# Patient Record
Sex: MALE | Race: OTHER | Employment: OTHER | URBAN - METROPOLITAN AREA
[De-identification: names, ages, dates, MRNs, and addresses within clinical notes are randomized per-mention and may not be internally consistent; named-entity substitution may affect disease eponyms.]

---

## 2023-11-21 ENCOUNTER — HOSPITAL ENCOUNTER (EMERGENCY)
Facility: HOSPITAL | Age: 73
Discharge: HOME | End: 2023-11-22
Attending: EMERGENCY MEDICINE
Payer: MEDICARE

## 2023-11-21 ENCOUNTER — APPOINTMENT (EMERGENCY)
Dept: CT IMAGING | Facility: HOSPITAL | Age: 73
End: 2023-11-21
Payer: MEDICARE

## 2023-11-21 DIAGNOSIS — R11.10 VOMITING: ICD-10-CM

## 2023-11-21 DIAGNOSIS — E86.0 DEHYDRATION: ICD-10-CM

## 2023-11-21 DIAGNOSIS — R07.9 CHEST PAIN: ICD-10-CM

## 2023-11-21 DIAGNOSIS — R53.1 ASTHENIA: Primary | ICD-10-CM

## 2023-11-21 DIAGNOSIS — R19.7 DIARRHEA: ICD-10-CM

## 2023-11-21 DIAGNOSIS — R53.1 WEAKNESS: ICD-10-CM

## 2023-11-21 DIAGNOSIS — R06.00 DYSPNEA: ICD-10-CM

## 2023-11-21 LAB
ALBUMIN SERPL BCP-MCNC: 3.8 G/DL (ref 3.5–5)
ALP SERPL-CCNC: 74 U/L (ref 34–104)
ALT SERPL W P-5'-P-CCNC: 25 U/L (ref 7–52)
ANION GAP SERPL CALCULATED.3IONS-SCNC: 8 MMOL/L
APTT PPP: 31 SECONDS (ref 23–37)
AST SERPL W P-5'-P-CCNC: 21 U/L (ref 13–39)
BACTERIA UR QL AUTO: NORMAL /HPF
BASE EX.OXY STD BLDV CALC-SCNC: 88.8 % (ref 60–80)
BASE EXCESS BLDV CALC-SCNC: -3.3 MMOL/L
BASOPHILS # BLD AUTO: 0.02 THOUSANDS/ÂΜL (ref 0–0.1)
BASOPHILS NFR BLD AUTO: 0 % (ref 0–1)
BETA-HYDROXYBUTYRATE: 1.7 MMOL/L
BILIRUB SERPL-MCNC: 0.43 MG/DL (ref 0.2–1)
BILIRUB UR QL STRIP: NEGATIVE
BUN SERPL-MCNC: 18 MG/DL (ref 5–25)
CALCIUM SERPL-MCNC: 8.8 MG/DL (ref 8.4–10.2)
CARDIAC TROPONIN I PNL SERPL HS: 4 NG/L
CHLORIDE SERPL-SCNC: 109 MMOL/L (ref 96–108)
CLARITY UR: CLEAR
CO2 SERPL-SCNC: 20 MMOL/L (ref 21–32)
COLOR UR: YELLOW
CREAT SERPL-MCNC: 0.61 MG/DL (ref 0.6–1.3)
D DIMER PPP FEU-MCNC: 0.47 UG/ML FEU
EOSINOPHIL # BLD AUTO: 0.1 THOUSAND/ÂΜL (ref 0–0.61)
EOSINOPHIL NFR BLD AUTO: 1 % (ref 0–6)
ERYTHROCYTE [DISTWIDTH] IN BLOOD BY AUTOMATED COUNT: 11.9 % (ref 11.6–15.1)
GFR SERPL CREATININE-BSD FRML MDRD: 104 ML/MIN/1.73SQ M
GLUCOSE SERPL-MCNC: 156 MG/DL (ref 65–140)
GLUCOSE SERPL-MCNC: 166 MG/DL (ref 65–140)
GLUCOSE UR STRIP-MCNC: ABNORMAL MG/DL
HCO3 BLDV-SCNC: 22.4 MMOL/L (ref 24–30)
HCT VFR BLD AUTO: 37.3 % (ref 36.5–49.3)
HGB BLD-MCNC: 12 G/DL (ref 12–17)
HGB UR QL STRIP.AUTO: NEGATIVE
IMM GRANULOCYTES # BLD AUTO: 0.03 THOUSAND/UL (ref 0–0.2)
IMM GRANULOCYTES NFR BLD AUTO: 0 % (ref 0–2)
INR PPP: 1.04 (ref 0.84–1.19)
KETONES UR STRIP-MCNC: ABNORMAL MG/DL
LACTATE SERPL-SCNC: 1 MMOL/L (ref 0.5–2)
LEUKOCYTE ESTERASE UR QL STRIP: ABNORMAL
LIPASE SERPL-CCNC: 26 U/L (ref 11–82)
LYMPHOCYTES # BLD AUTO: 1.02 THOUSANDS/ÂΜL (ref 0.6–4.47)
LYMPHOCYTES NFR BLD AUTO: 12 % (ref 14–44)
MCH RBC QN AUTO: 28.7 PG (ref 26.8–34.3)
MCHC RBC AUTO-ENTMCNC: 32.2 G/DL (ref 31.4–37.4)
MCV RBC AUTO: 89 FL (ref 82–98)
MONOCYTES # BLD AUTO: 0.48 THOUSAND/ÂΜL (ref 0.17–1.22)
MONOCYTES NFR BLD AUTO: 6 % (ref 4–12)
NEUTROPHILS # BLD AUTO: 6.7 THOUSANDS/ÂΜL (ref 1.85–7.62)
NEUTS SEG NFR BLD AUTO: 81 % (ref 43–75)
NITRITE UR QL STRIP: NEGATIVE
NON-SQ EPI CELLS URNS QL MICRO: NORMAL /HPF
NRBC BLD AUTO-RTO: 0 /100 WBCS
O2 CT BLDV-SCNC: 16.6 ML/DL
PCO2 BLDV: 42.4 MM HG (ref 42–50)
PH BLDV: 7.34 [PH] (ref 7.3–7.4)
PH UR STRIP.AUTO: 7 [PH]
PLATELET # BLD AUTO: 203 THOUSANDS/UL (ref 149–390)
PMV BLD AUTO: 9.9 FL (ref 8.9–12.7)
PO2 BLDV: 61 MM HG (ref 35–45)
POTASSIUM SERPL-SCNC: 3.7 MMOL/L (ref 3.5–5.3)
PROT SERPL-MCNC: 6.7 G/DL (ref 6.4–8.4)
PROT UR STRIP-MCNC: ABNORMAL MG/DL
PROTHROMBIN TIME: 14.2 SECONDS (ref 11.6–14.5)
RBC # BLD AUTO: 4.18 MILLION/UL (ref 3.88–5.62)
RBC #/AREA URNS AUTO: NORMAL /HPF
SODIUM SERPL-SCNC: 137 MMOL/L (ref 135–147)
SP GR UR STRIP.AUTO: 1.03 (ref 1–1.03)
UROBILINOGEN UR STRIP-ACNC: <2 MG/DL
WBC # BLD AUTO: 8.35 THOUSAND/UL (ref 4.31–10.16)
WBC #/AREA URNS AUTO: NORMAL /HPF

## 2023-11-21 PROCEDURE — 83690 ASSAY OF LIPASE: CPT | Performed by: EMERGENCY MEDICINE

## 2023-11-21 PROCEDURE — 87040 BLOOD CULTURE FOR BACTERIA: CPT | Performed by: EMERGENCY MEDICINE

## 2023-11-21 PROCEDURE — 70450 CT HEAD/BRAIN W/O DYE: CPT

## 2023-11-21 PROCEDURE — 81001 URINALYSIS AUTO W/SCOPE: CPT | Performed by: EMERGENCY MEDICINE

## 2023-11-21 PROCEDURE — 82948 REAGENT STRIP/BLOOD GLUCOSE: CPT

## 2023-11-21 PROCEDURE — 80053 COMPREHEN METABOLIC PANEL: CPT | Performed by: EMERGENCY MEDICINE

## 2023-11-21 PROCEDURE — 85379 FIBRIN DEGRADATION QUANT: CPT | Performed by: EMERGENCY MEDICINE

## 2023-11-21 PROCEDURE — 85730 THROMBOPLASTIN TIME PARTIAL: CPT | Performed by: EMERGENCY MEDICINE

## 2023-11-21 PROCEDURE — 85025 COMPLETE CBC W/AUTO DIFF WBC: CPT | Performed by: EMERGENCY MEDICINE

## 2023-11-21 PROCEDURE — 85610 PROTHROMBIN TIME: CPT | Performed by: EMERGENCY MEDICINE

## 2023-11-21 PROCEDURE — 99285 EMERGENCY DEPT VISIT HI MDM: CPT | Performed by: EMERGENCY MEDICINE

## 2023-11-21 PROCEDURE — 99284 EMERGENCY DEPT VISIT MOD MDM: CPT

## 2023-11-21 PROCEDURE — 83605 ASSAY OF LACTIC ACID: CPT | Performed by: EMERGENCY MEDICINE

## 2023-11-21 PROCEDURE — 74177 CT ABD & PELVIS W/CONTRAST: CPT

## 2023-11-21 PROCEDURE — 96360 HYDRATION IV INFUSION INIT: CPT

## 2023-11-21 PROCEDURE — 82805 BLOOD GASES W/O2 SATURATION: CPT | Performed by: EMERGENCY MEDICINE

## 2023-11-21 PROCEDURE — 36415 COLL VENOUS BLD VENIPUNCTURE: CPT | Performed by: EMERGENCY MEDICINE

## 2023-11-21 PROCEDURE — 71260 CT THORAX DX C+: CPT

## 2023-11-21 PROCEDURE — 93005 ELECTROCARDIOGRAM TRACING: CPT

## 2023-11-21 PROCEDURE — 84484 ASSAY OF TROPONIN QUANT: CPT | Performed by: EMERGENCY MEDICINE

## 2023-11-21 PROCEDURE — 82010 KETONE BODYS QUAN: CPT | Performed by: EMERGENCY MEDICINE

## 2023-11-21 PROCEDURE — 96361 HYDRATE IV INFUSION ADD-ON: CPT

## 2023-11-21 RX ORDER — DROPERIDOL 2.5 MG/ML
1 INJECTION, SOLUTION INTRAMUSCULAR; INTRAVENOUS ONCE
Status: COMPLETED | OUTPATIENT
Start: 2023-11-21 | End: 2023-11-21

## 2023-11-21 RX ADMIN — SODIUM CHLORIDE 1000 ML: 0.9 INJECTION, SOLUTION INTRAVENOUS at 21:58

## 2023-11-21 RX ADMIN — IOHEXOL 100 ML: 350 INJECTION, SOLUTION INTRAVENOUS at 23:37

## 2023-11-21 RX ADMIN — SODIUM CHLORIDE 1000 ML: 0.9 INJECTION, SOLUTION INTRAVENOUS at 23:23

## 2023-11-22 VITALS
DIASTOLIC BLOOD PRESSURE: 62 MMHG | HEART RATE: 100 BPM | SYSTOLIC BLOOD PRESSURE: 139 MMHG | RESPIRATION RATE: 17 BRPM | TEMPERATURE: 98.7 F | OXYGEN SATURATION: 91 %

## 2023-11-22 LAB
2HR DELTA HS TROPONIN: 0 NG/L
ATRIAL RATE: 102 BPM
ATRIAL RATE: 86 BPM
CARDIAC TROPONIN I PNL SERPL HS: 4 NG/L
GLUCOSE SERPL-MCNC: 156 MG/DL (ref 65–140)
P AXIS: 67 DEGREES
P AXIS: 70 DEGREES
PR INTERVAL: 172 MS
PR INTERVAL: 180 MS
QRS AXIS: 35 DEGREES
QRS AXIS: 51 DEGREES
QRSD INTERVAL: 88 MS
QRSD INTERVAL: 96 MS
QT INTERVAL: 384 MS
QT INTERVAL: 394 MS
QTC INTERVAL: 471 MS
QTC INTERVAL: 500 MS
T WAVE AXIS: 60 DEGREES
T WAVE AXIS: 72 DEGREES
VENTRICULAR RATE: 102 BPM
VENTRICULAR RATE: 86 BPM

## 2023-11-22 PROCEDURE — 93010 ELECTROCARDIOGRAM REPORT: CPT | Performed by: INTERNAL MEDICINE

## 2023-11-22 PROCEDURE — 82948 REAGENT STRIP/BLOOD GLUCOSE: CPT

## 2023-11-22 NOTE — ED NOTES
Pt states chest tightness and says "I am not breathing right." EKG repeated and provider made aware.      Raven Garcia  11/21/23 2232

## 2023-11-22 NOTE — ED PROVIDER NOTES
History  Chief Complaint   Patient presents with    Vomiting     New onset of nausea vomiting and diarrhea starting today at 1600. Patient reports getting fatigued and not being able to get up off the toilet, needing to call family. Patient reports continued fatigue. GCS 15. Droperidol administered by EMS on route for nausea     81-year-old male presents to the emergency room with a chief complaint of "I lost control of my legs" around [id number] hrs. according to the patient. Patient states he had to go to the bathroom and sat down and subsequently was unable to stand. Patient affirms it was both legs and denies any focal motor weakness. Patient states he became nauseous and vomited around 1900 hrs. and subsequently had 3 episodes of diarrhea. Patient denies any headache or visual changes. Patient denies any sensory loss or any focal motor weakness. Patient denies any chest pain. Patient notes "a little bit" of shortness of breath and occasionally his pulse oximetry drops to the upper 80s though this rebounds without intervention stays in the upper 90s. Patient denies any abdominal pain though he does note the vomiting and diarrhea as previously noted. Patient denies any fever and is afebrile upon arrival to the emergency room. Patient notes fatigue though he drove from [place] last night straight through to [place] today to visit his family. Patient notes dizziness that he has difficulty fully describing though he states this has subsequently resolved and he denies any dizziness at present. Patient has a history of prior venous thrombosis for unclear inciting event though he is no longer on anticoagulation secondary to prior intracranial bleeding. Patient notes a history of diabetes. Impression and plan: Multiple symptoms with a broad differential.  Considering the associated vomiting and diarrhea, possibly infectious etiology though patient denies any abdominal pain.   Patient notes dizziness though this is subsequently resolved, he denies any focal motor weakness, repeatedly insisting that he had bilateral leg weakness and his neurologic exam is nonfocal at present with an NIH stroke scale of 0. Patient would be outside the window for thrombolytics and does not have any signs of large vessel occlusion that warranted intervention. Patient has a history of prior venous thrombosis with recent extended travel and is off anticoagulation though he denies any chest pain, he does have intermittent episodes of hypoxia and some associated dyspnea. Obtain metabolic and infectious evaluation. Will obtain cardiac evaluation and risk stratify patient with D-dimer as he is low risk Wells. Will obtain CT imaging of patient's head considering atypical constellation of symptoms with prior history of intracranial bleeding this appears to be secondary to prior trauma. Will treat patient symptomatically, monitor, and reassess. EKG obtained demonstrates normal sinus rhythm with no acute ST segment changes, no prior to compare. QTc of 471. None       History reviewed. No pertinent past medical history. History reviewed. No pertinent surgical history. History reviewed. No pertinent family history. I have reviewed and agree with the history as documented. E-Cigarette/Vaping     E-Cigarette/Vaping Substances     Social History     Tobacco Use    Smoking status: Never    Smokeless tobacco: Never   Substance Use Topics    Alcohol use: Never    Drug use: Never       Review of Systems    Physical Exam  Physical Exam  Vitals reviewed. HENT:      Head: Atraumatic. Eyes:      General: No visual field deficit. Pupils: Pupils are equal, round, and reactive to light. Cardiovascular:      Rate and Rhythm: Normal rate. Pulmonary:      Effort: Pulmonary effort is normal.      Breath sounds: Normal breath sounds. Abdominal:      General: There is no distension. Tenderness:  There is no abdominal tenderness. There is no guarding or rebound. Musculoskeletal:         General: No deformity. Cervical back: Neck supple. Skin:     General: Skin is warm and dry. Neurological:      General: No focal deficit present. Mental Status: He is alert and oriented to person, place, and time. Cranial Nerves: Cranial nerves 2-12 are intact. No cranial nerve deficit, dysarthria or facial asymmetry. Sensory: Sensation is intact. Motor: Motor function is intact. No weakness. Coordination: Coordination is intact. Romberg sign negative. Finger-Nose-Finger Test and Heel to Peña Test normal.      Gait: Gait is intact. Comments: Note the patient became nauseous while attempting gait challenge but is able to ambulate without assistance and denied any dizziness.          Vital Signs  ED Triage Vitals   Temperature Pulse Respirations Blood Pressure SpO2   11/21/23 2100 11/21/23 2100 11/21/23 2100 11/21/23 2100 11/21/23 2100   98.7 °F (37.1 °C) 87 18 162/77 98 %      Temp Source Heart Rate Source Patient Position - Orthostatic VS BP Location FiO2 (%)   11/21/23 2100 11/21/23 2100 11/21/23 2200 11/21/23 2200 --   Temporal Monitor Lying Right arm       Pain Score       --                  Vitals:    11/21/23 2300 11/21/23 2345 11/22/23 0000 11/22/23 0030   BP:  145/66 158/74 139/62   Pulse: 97 101 103 100   Patient Position - Orthostatic VS:  Sitting           Visual Acuity  Visual Acuity      Flowsheet Row Most Recent Value   L Pupil Size (mm) 3   R Pupil Size (mm) 3            ED Medications  Medications   droperidol (FOR EMS ONLY) (INAPSINE) 2.5 mg/mL injection 2.5 mg (0 mg Does not apply Given to EMS 11/21/23 2120)   sodium chloride 0.9 % bolus 1,000 mL (0 mL Intravenous Stopped 11/22/23 0053)   sodium chloride 0.9 % bolus 1,000 mL (0 mL Intravenous Stopped 11/22/23 0053)   iohexol (OMNIPAQUE) 350 MG/ML injection (MULTI-DOSE) 100 mL (100 mL Intravenous Given 11/21/23 2337) Diagnostic Studies  Results Reviewed       Procedure Component Value Units Date/Time    HS Troponin I 2hr [[id number]]  (Normal) Collected: 11/21/23 2345    Lab Status: Final result Specimen: Blood from Arm, Right Updated: 11/22/23 0019     hs TnI 2hr 4 ng/L      Delta 2hr hsTnI 0 ng/L     Fingerstick Glucose (POCT) [[id number]]  (Abnormal) Collected: 11/22/23 0000    Lab Status: Final result Updated: 11/22/23 0002     POC Glucose 156 mg/dl     Beta Hydroxybutyrate [[id number]]  (Abnormal) Collected: 11/21/23 2244    Lab Status: Final result Specimen: Blood from Arm, Right Updated: 11/21/23 2304     BETA-HYDROXYBUTYRATE 1.7 mmol/L     HS Troponin 0hr (reflex protocol) [[id number]]  (Normal) Collected: 11/21/23 2155    Lab Status: Final result Specimen: Blood from Arm, Right Updated: 11/21/23 2228     hs TnI 0hr 4 ng/L     Lactic acid, plasma (w/reflex if result > 2.0) [[id number]]  (Normal) Collected: 11/21/23 2155    Lab Status: Final result Specimen: Blood from Arm, Right Updated: 11/21/23 2222     LACTIC ACID 1.0 mmol/L     Narrative:      Result may be elevated if tourniquet was used during collection. D-Dimer [[id number]]  (Normal) Collected: 11/21/23 2155    Lab Status: Final result Specimen: Blood from Arm, Right Updated: 11/21/23 2218     D-Dimer, Quant 0.47 ug/ml FEU     Narrative: In the evaluation for possible pulmonary embolism, in the appropriate (Well's Score of 4 or less) patient, the age adjusted d-dimer cutoff for this patient can be calculated as:    Age x 0.01 (in ug/mL) for Age-adjusted D-dimer exclusion threshold for a patient over 50 years.     Swetha Hodgkin [[id number]]  (Normal) Collected: 11/21/23 2155    Lab Status: Final result Specimen: Blood from Arm, Right Updated: 11/21/23 2217     Protime 14.2 seconds      INR 1.04    APTT [[id number]]  (Normal) Collected: 11/21/23 2155    Lab Status: Final result Specimen: Blood from Arm, Right Updated: 11/21/23 2217     PTT 31 seconds     Urine Microscopic [[id number]]  (Normal) Collected: 11/21/23 2156    Lab Status: Final result Specimen: Urine, Clean Catch Updated: 11/21/23 2214     RBC, UA 1-2 /hpf      WBC, UA None Seen /hpf      Epithelial Cells Occasional /hpf      Bacteria, UA None Seen /hpf     UA w Reflex to Microscopic w Reflex to Culture [[id number]]  (Abnormal) Collected: 11/21/23 2156    Lab Status: Final result Specimen: Urine, Clean Catch Updated: 11/21/23 2213     Color, UA Yellow     Clarity, UA Clear     Specific Gravity, UA 1.032     pH, UA 7.0     Leukocytes, UA Elevated glucose may cause decreased leukocyte values. See urine microscopic for UWBC result     Nitrite, UA Negative     Protein, UA 30 (1+) mg/dl      Glucose, UA >=1000 (1%) mg/dl      Ketones, UA 20 (1+) mg/dl      Urobilinogen, UA <2.0 mg/dl      Bilirubin, UA Negative     Occult Blood, UA Negative    Blood gas, venous [[id number]]  (Abnormal) Collected: 11/21/23 2155    Lab Status: Final result Specimen: Blood from Arm, Right Updated: 11/21/23 2204     pH, Gabriel 7.340     pCO2, Gabriel 42.4 mm Hg      pO2, Gabriel 61.0 mm Hg      HCO3, Gabriel 22.4 mmol/L      Base Excess, Gabriel -3.3 mmol/L      O2 Content, Gabriel 16.6 ml/dL      O2 HGB, VENOUS 88.8 %     Blood culture #2 [[id number]] Collected: 11/21/23 2158    Lab Status: In process Specimen: Blood from Hand, Right Updated: 11/21/23 2201    Blood culture #1 [[id number]] Collected: 11/21/23 2155    Lab Status:  In process Specimen: Blood from Arm, Right Updated: 11/21/23 2201    CMP [[id number]]  (Abnormal) Collected: 11/21/23 2105    Lab Status: Final result Specimen: Blood from Arm, Left Updated: 11/21/23 2130     Sodium 137 mmol/L      Potassium 3.7 mmol/L      Chloride 109 mmol/L      CO2 20 mmol/L      ANION GAP 8 mmol/L      BUN 18 mg/dL      Creatinine 0.61 mg/dL      Glucose 166 mg/dL      Calcium 8.8 mg/dL      AST 21 U/L      ALT 25 U/L      Alkaline Phosphatase 74 U/L      Total Protein 6.7 g/dL      Albumin 3.8 g/dL      Total Bilirubin 0.43 mg/dL      eGFR 104 ml/min/1.73sq m     Narrative:      [facility] Kid[facility] Disease (CKD):     Stage 1 with normal or high GFR (GFR > 90 mL/min/1.73 square meters)    Stage 2 Mild CKD (GFR = 60-89 mL/min/1.73 square meters)    Stage 3A Moderate CKD (GFR = 45-59 mL/min/1.73 square meters)    Stage 3B Moderate CKD (GFR = 30-44 mL/min/1.73 square meters)    Stage 4 Severe CKD (GFR = 15-29 mL/min/1.73 square meters)    Stage 5 End Stage CKD (GFR <15 mL/min/1.73 square meters)  Note: GFR calculation is accurate only with a steady state creatinine    Lipase [[id number]]  (Normal) Collected: 11/21/23 2105    Lab Status: Final result Specimen: Blood from Arm, Left Updated: 11/21/23 2130     Lipase 26 u/L     CBC and differential [[id number]]  (Abnormal) Collected: 11/21/23 2105    Lab Status: Final result Specimen: Blood from Arm, Left Updated: 11/21/23 2110     WBC 8.35 Thousand/uL      RBC 4.18 Million/uL      Hemoglobin 12.0 g/dL      Hematocrit 37.3 %      MCV 89 fL      MCH 28.7 pg      MCHC 32.2 g/dL      RDW 11.9 %      MPV 9.9 fL      Platelets 351 Thousands/uL      nRBC 0 /100 WBCs      Neutrophils Relative 81 %      Immat GRANS % 0 %      Lymphocytes Relative 12 %      Monocytes Relative 6 %      Eosinophils Relative 1 %      Basophils Relative 0 %      Neutrophils Absolute 6.70 Thousands/µL      Immature Grans Absolute 0.03 Thousand/uL      Lymphocytes Absolute 1.02 Thousands/µL      Monocytes Absolute 0.48 Thousand/µL      Eosinophils Absolute 0.10 Thousand/µL      Basophils Absolute 0.02 Thousands/µL     Fingerstick Glucose (POCT) [[id number]]  (Abnormal) Collected: 11/21/23 2100    Lab Status: Final result Updated: 11/21/23 2102     POC Glucose 156 mg/dl                    CT head without contrast   Final Result by Nalini Church MD (11/22 0022)         1. No acute intracranial abnormality.    2. Paranasal sinus disease                  Workstation performed: [id number]         CT chest abdomen pelvis w contrast   Final Result by Maeve Patterson MD (11/22 0028)      No evidence of acute process in the chest, abdomen or pelvis. Workstation performed: [id number]                    Procedures  Procedures         ED Course  ED Course as of 11/22/23 0246   Tue Nov 21, 2023   2302 Patient contacted nursing and told him that he was having chest pain and difficulty breathing. Repeat EKG was obtained and demonstrates sinus tachycardia rate of 102, nonspecific findings that are similar to the prior but no acute ST segment changes. On my reassessment, patient states his chest pain has resolved and his shortness of breath has nearly resolved. Patient's pulse oximetry is 98%. Patient states he has had multiple prior evaluations for similar symptoms previously in [place] though he is not clear as to the last time that this was evaluated, he denies any admissions this year. Review of the medical record does not reflect any recent evaluations or treatment for this though this is limited to the available information in care everywhere. Wed Nov 22, 2023   0041 Patient is aborter evaluation demonstrates mild hyperglycemia with elevated beta hydroxybutyrate and normal pH with no anion gap. Patient tolerating oral intake without difficulty on the emergency room. CT imaging without acute findings that would explain patient's symptoms. Considering diagnostic uncertainty of the medical issues and unclear history, offered observation to the patient but after discussion of risks and benefits, patient declining. Patient appears clear and competent making medical decisions and I discussed risk and benefits of this in detail. Patient has agreed to return to emergency room progression or worsening symptoms. Discussed and emphasized these return precautions in detail to the patient and his family.   Discussed follow-up with existing resources once returning or returning to the emergency room if symptoms continue or worsen while visiting in the area. Patient asymptomatic at present, ambulating throughout the emergency room with no difficulty and tolerating oral intake. HEART Risk Score      Flowsheet Row Most Recent Value   Heart Score Risk Calculator    History 0 Filed at: 11/22/2023 0046   ECG 1 Filed at: 11/22/2023 0046   Age 2 Filed at: 11/22/2023 0046   Risk Factors 2 Filed at: 11/22/2023 0046   Troponin 0 Filed at: 11/22/2023 0046   HEART Score 5 Filed at: 11/22/2023 0046                          SBIRT 22yo+      Flowsheet Row Most Recent Value   Initial Alcohol Screen: US AUDIT-C     1. How often do you have a drink containing alcohol? 0 Filed at: 11/21/2023 2201   2. How many drinks containing alcohol do you have on a typical day you are drinking? 0 Filed at: 11/21/2023 2201   3a. Male UNDER 65: How often do you have five or more drinks on one occasion? 0 Filed at: 11/21/2023 2201   3b. FEMALE Any Age, or MALE 65+: How often do you have 4 or more drinks on one occassion? 0 Filed at: 11/21/2023 2201   Audit-C Score 0 Filed at: 11/21/2023 2201   LEONID: How many times in the past year have you. .. Used an illegal drug or used a prescription medication for non-medical reasons? Never Filed at: 11/21/2023 2201                      Medical Decision Making  Amount and/or Complexity of Data Reviewed  Labs: ordered. Radiology: ordered. Risk  Prescription drug management.              Disposition  Final diagnoses:   Asthenia   Vomiting   Diarrhea   Dehydration   Weakness   Chest pain   Dyspnea     Time reflects when diagnosis was documented in both MDM as applicable and the Disposition within this note       Time User Action Codes Description Comment    11/22/2023 12:45 AM Marshal Romance Add [R53.1] Asthenia     11/22/2023 12:45 AM Marshal Romance Add [R11.10] Vomiting     11/22/2023 12:45 AM Marshal Romance Add [R19.7] Diarrhea     11/22/2023 12:45 AM Pao Jerald [E86.0] Dehydration     11/22/2023 12:45 AM Ilah Tovar Add [R53.1] Weakness     11/22/2023 12:45 AM Ilah Tovar Add [R07.9] Chest pain     11/22/2023 12:47 AM Ilah Tovar Add [R06.00] Dyspnea           ED Disposition       ED Disposition   Discharge    Condition   Stable    Date/Time   Wed Nov 22, 2023 4945 [facility] discharge to home/self care. Follow-up Information       Follow up With Specialties Details Why Contact Info Additional [facility] Emergency Department Emergency Medicine Go to  If symptoms worsen [address] [facility] [id number] [facility], [place], [place], [address]            There are no discharge medications for this patient. No discharge procedures on file.     PDMP Review       None            ED Provider  Electronically Signed by             Lucita Menchaca MD  11/22/23 0202

## 2023-11-26 LAB
BACTERIA BLD CULT: NORMAL
BACTERIA BLD CULT: NORMAL

## 2023-11-27 LAB
BACTERIA BLD CULT: NORMAL
BACTERIA BLD CULT: NORMAL